# Patient Record
Sex: FEMALE | Race: WHITE | NOT HISPANIC OR LATINO | Employment: UNEMPLOYED | RURAL
[De-identification: names, ages, dates, MRNs, and addresses within clinical notes are randomized per-mention and may not be internally consistent; named-entity substitution may affect disease eponyms.]

---

## 2021-04-08 ENCOUNTER — OFFICE VISIT (OUTPATIENT)
Dept: OBSTETRICS AND GYNECOLOGY | Facility: CLINIC | Age: 61
End: 2021-04-08
Payer: COMMERCIAL

## 2021-04-08 VITALS
SYSTOLIC BLOOD PRESSURE: 130 MMHG | BODY MASS INDEX: 25.52 KG/M2 | DIASTOLIC BLOOD PRESSURE: 76 MMHG | HEIGHT: 61 IN | WEIGHT: 135.19 LBS

## 2021-04-08 DIAGNOSIS — K63.9 ARTHRITIS ASSOCIATED WITH INFLAMMATORY BOWEL DISEASE: ICD-10-CM

## 2021-04-08 DIAGNOSIS — N95.2 ATROPHIC VAGINITIS: Primary | ICD-10-CM

## 2021-04-08 DIAGNOSIS — K50.10 CROHN'S DISEASE OF ANAL CANAL: ICD-10-CM

## 2021-04-08 DIAGNOSIS — M07.60 ARTHRITIS ASSOCIATED WITH INFLAMMATORY BOWEL DISEASE: ICD-10-CM

## 2021-04-08 DIAGNOSIS — Z01.419 WOMEN'S ANNUAL ROUTINE GYNECOLOGICAL EXAMINATION: ICD-10-CM

## 2021-04-08 DIAGNOSIS — E89.41 SYMPTOMATIC POSTPROCEDURAL OVARIAN FAILURE: ICD-10-CM

## 2021-04-08 PROBLEM — K50.90 CROHN'S DISEASE: Status: ACTIVE | Noted: 2021-04-08

## 2021-04-08 PROCEDURE — G0101 PR CA SCREEN;PELVIC/BREAST EXAM: ICD-10-PCS | Mod: S$PBB,,, | Performed by: OBSTETRICS & GYNECOLOGY

## 2021-04-08 PROCEDURE — 99999 PR PBB SHADOW E&M-NEW PATIENT-LVL IV: CPT | Mod: PBBFAC,,, | Performed by: OBSTETRICS & GYNECOLOGY

## 2021-04-08 PROCEDURE — 99999 PR PBB SHADOW E&M-NEW PATIENT-LVL IV: ICD-10-PCS | Mod: PBBFAC,,, | Performed by: OBSTETRICS & GYNECOLOGY

## 2021-04-08 PROCEDURE — 99204 OFFICE O/P NEW MOD 45 MIN: CPT | Mod: PBBFAC | Performed by: OBSTETRICS & GYNECOLOGY

## 2021-04-08 PROCEDURE — G0101 CA SCREEN;PELVIC/BREAST EXAM: HCPCS | Mod: S$PBB,,, | Performed by: OBSTETRICS & GYNECOLOGY

## 2021-04-08 RX ORDER — AZATHIOPRINE 50 MG/1
50 TABLET ORAL DAILY
COMMUNITY

## 2021-04-08 RX ORDER — ATENOLOL 25 MG/1
25 TABLET ORAL DAILY
COMMUNITY

## 2021-04-08 RX ORDER — TRIAMTERENE/HYDROCHLOROTHIAZID 37.5-25 MG
1 TABLET ORAL DAILY
COMMUNITY

## 2021-04-08 RX ORDER — ESOMEPRAZOLE MAGNESIUM 40 MG/1
40 CAPSULE, DELAYED RELEASE ORAL
COMMUNITY

## 2021-04-08 RX ORDER — ESTRADIOL 0.1 MG/G
2 CREAM VAGINAL
COMMUNITY
End: 2021-10-07

## 2021-04-08 RX ORDER — METHOCARBAMOL 500 MG/1
500 TABLET, FILM COATED ORAL 2 TIMES DAILY
COMMUNITY

## 2021-04-08 RX ORDER — ZOLPIDEM TARTRATE 5 MG/1
5 TABLET ORAL NIGHTLY PRN
COMMUNITY

## 2021-04-08 RX ORDER — EZETIMIBE AND SIMVASTATIN 10; 80 MG/1; MG/1
1 TABLET ORAL DAILY
COMMUNITY

## 2021-04-08 RX ORDER — ESTRADIOL 0.1 MG/G
2 CREAM VAGINAL
Qty: 1 TUBE | Refills: 2 | Status: CANCELLED | OUTPATIENT
Start: 2021-04-08

## 2021-04-08 RX ORDER — GABAPENTIN 600 MG/1
600 TABLET ORAL 4 TIMES DAILY
COMMUNITY

## 2021-04-08 RX ORDER — ESTRADIOL 0.1 MG/G
CREAM VAGINAL
Qty: 42.5 G | Refills: 1 | Status: SHIPPED | OUTPATIENT
Start: 2021-04-08 | End: 2021-10-07

## 2021-10-07 ENCOUNTER — OFFICE VISIT (OUTPATIENT)
Dept: OBSTETRICS AND GYNECOLOGY | Facility: CLINIC | Age: 61
End: 2021-10-07
Payer: COMMERCIAL

## 2021-10-07 VITALS
WEIGHT: 135.19 LBS | DIASTOLIC BLOOD PRESSURE: 84 MMHG | BODY MASS INDEX: 25.55 KG/M2 | SYSTOLIC BLOOD PRESSURE: 138 MMHG

## 2021-10-07 DIAGNOSIS — N95.2 ATROPHIC VAGINITIS: Primary | ICD-10-CM

## 2021-10-07 PROCEDURE — 99212 PR OFFICE/OUTPT VISIT, EST, LEVL II, 10-19 MIN: ICD-10-PCS | Mod: S$PBB,,, | Performed by: OBSTETRICS & GYNECOLOGY

## 2021-10-07 PROCEDURE — 99212 OFFICE O/P EST SF 10 MIN: CPT | Mod: S$PBB,,, | Performed by: OBSTETRICS & GYNECOLOGY

## 2021-10-07 PROCEDURE — 99213 OFFICE O/P EST LOW 20 MIN: CPT | Mod: PBBFAC | Performed by: OBSTETRICS & GYNECOLOGY

## 2021-10-07 RX ORDER — BLOOD-GLUCOSE METER
KIT MISCELLANEOUS
COMMUNITY
Start: 2021-06-04

## 2021-10-07 RX ORDER — ESTRADIOL 0.1 MG/G
CREAM VAGINAL
Qty: 42.5 G | Refills: 1 | Status: SHIPPED | OUTPATIENT
Start: 2021-10-07 | End: 2023-03-20

## 2021-10-07 RX ORDER — ATENOLOL 50 MG/1
50 TABLET ORAL DAILY
COMMUNITY

## 2021-10-07 RX ORDER — BLOOD-GLUCOSE METER
KIT MISCELLANEOUS
COMMUNITY
Start: 2021-05-19

## 2021-10-07 RX ORDER — LANCETS 28 GAUGE
EACH MISCELLANEOUS
COMMUNITY
Start: 2021-05-19

## 2021-10-07 RX ORDER — ONDANSETRON 4 MG/1
TABLET, ORALLY DISINTEGRATING ORAL
COMMUNITY
Start: 2021-09-16

## 2023-06-29 DIAGNOSIS — N95.2 ATROPHIC VAGINITIS: ICD-10-CM

## 2023-07-05 RX ORDER — ESTRADIOL 0.1 MG/G
CREAM VAGINAL
Qty: 42.5 G | Refills: 1 | Status: SHIPPED | OUTPATIENT
Start: 2023-07-05 | End: 2023-11-02 | Stop reason: SDUPTHER

## 2023-11-02 DIAGNOSIS — N95.2 ATROPHIC VAGINITIS: ICD-10-CM

## 2023-11-02 RX ORDER — ESTRADIOL 0.1 MG/G
CREAM VAGINAL
Qty: 42.5 G | Refills: 1 | Status: SHIPPED | OUTPATIENT
Start: 2023-11-02 | End: 2024-01-12 | Stop reason: SDUPTHER

## 2024-01-12 ENCOUNTER — PATIENT MESSAGE (OUTPATIENT)
Dept: OBSTETRICS AND GYNECOLOGY | Facility: CLINIC | Age: 64
End: 2024-01-12
Payer: MEDICARE

## 2024-01-12 DIAGNOSIS — N95.2 ATROPHIC VAGINITIS: ICD-10-CM

## 2024-01-12 RX ORDER — ESTRADIOL 0.1 MG/G
CREAM VAGINAL
Qty: 42.5 G | Refills: 3 | Status: SHIPPED | OUTPATIENT
Start: 2024-01-12

## 2024-01-17 DIAGNOSIS — M25.512 LEFT SHOULDER PAIN, UNSPECIFIED CHRONICITY: Primary | ICD-10-CM

## 2024-01-18 ENCOUNTER — HOSPITAL ENCOUNTER (OUTPATIENT)
Dept: RADIOLOGY | Facility: HOSPITAL | Age: 64
Discharge: HOME | End: 2024-01-18
Payer: COMMERCIAL

## 2024-01-18 ENCOUNTER — OFFICE VISIT (OUTPATIENT)
Dept: ORTHOPEDICS | Facility: CLINIC | Age: 64
End: 2024-01-18
Payer: COMMERCIAL

## 2024-01-18 DIAGNOSIS — M25.512 LEFT SHOULDER PAIN, UNSPECIFIED CHRONICITY: ICD-10-CM

## 2024-01-18 DIAGNOSIS — M25.512 LEFT SHOULDER PAIN, UNSPECIFIED CHRONICITY: Primary | ICD-10-CM

## 2024-01-18 PROCEDURE — 99203 OFFICE O/P NEW LOW 30 MIN: CPT | Mod: S$PBB,25,,

## 2024-01-18 PROCEDURE — 20605 DRAIN/INJ JOINT/BURSA W/O US: CPT | Mod: PBBFAC,LT

## 2024-01-18 PROCEDURE — 99999PBSHW PR PBB SHADOW TECHNICAL ONLY FILED TO HB: Mod: JZ,PBBFAC,,

## 2024-01-18 PROCEDURE — 99214 OFFICE O/P EST MOD 30 MIN: CPT | Mod: PBBFAC,25

## 2024-01-18 PROCEDURE — 73030 X-RAY EXAM OF SHOULDER: CPT | Mod: TC,LT

## 2024-01-18 PROCEDURE — 73030 X-RAY EXAM OF SHOULDER: CPT | Mod: 26,LT,, | Performed by: RADIOLOGY

## 2024-01-18 RX ORDER — LOSARTAN POTASSIUM 50 MG/1
50 TABLET ORAL DAILY
COMMUNITY

## 2024-01-18 RX ORDER — TRIAMCINOLONE ACETONIDE 40 MG/ML
40 INJECTION, SUSPENSION INTRA-ARTICULAR; INTRAMUSCULAR
Status: DISCONTINUED | OUTPATIENT
Start: 2024-01-18 | End: 2024-01-18 | Stop reason: HOSPADM

## 2024-01-18 RX ORDER — BUSPIRONE HYDROCHLORIDE 15 MG/1
15 TABLET ORAL 2 TIMES DAILY
COMMUNITY

## 2024-01-18 RX ORDER — TRETINOIN 0.5 MG/G
0.05 CREAM TOPICAL NIGHTLY
COMMUNITY
Start: 2023-11-06

## 2024-01-18 RX ORDER — MELOXICAM 7.5 MG/1
7.5 TABLET ORAL DAILY
Qty: 30 TABLET | Refills: 0 | Status: SHIPPED | OUTPATIENT
Start: 2024-01-18

## 2024-01-18 RX ORDER — BUPIVACAINE HYDROCHLORIDE 2.5 MG/ML
25 INJECTION, SOLUTION EPIDURAL; INFILTRATION; INTRACAUDAL
Status: DISCONTINUED | OUTPATIENT
Start: 2024-01-18 | End: 2024-01-18 | Stop reason: HOSPADM

## 2024-01-18 RX ADMIN — BUPIVACAINE HYDROCHLORIDE 25 MG: 2.5 INJECTION, SOLUTION EPIDURAL; INFILTRATION; INTRACAUDAL at 09:01

## 2024-01-18 RX ADMIN — TRIAMCINOLONE ACETONIDE 40 MG: 40 INJECTION, SUSPENSION INTRA-ARTICULAR; INTRAMUSCULAR at 09:01

## 2024-01-18 NOTE — PATIENT INSTRUCTIONS
Left shoulder pain for 2-3 weeks after lifting heavy pots and pans in her kitchen.  Possible muscle strain versus impingement syndrome.  Personally reviewed x-rays today with minimal degenerative changes as noted above.  Discussed all treatment options.  Patient would like to pursue conservative management at this time, she is apprehensive about any possible surgical intervention as she has had multiple orthopedic surgeries in the past including her other shoulder in both knees.  Discussed steroid injection, Mobic, and physically were all options.  Discussed that she needs to continue to take her Nexium and will prescribe a low dose of Mobic.  We will give steroid injection today, see procedural note for details.  We will also order physical therapy for 6 weeks.  Patient to follow up in 6 weeks, sooner if needed.

## 2024-01-18 NOTE — PROCEDURES
Intermediate Joint Aspiration/Injection: L acromioclavicular    Date/Time: 1/18/2024 9:00 AM    Performed by: Delma Loja PA  Authorized by: Delma Loja PA    Consent Done?:  Yes (Verbal)  Indications:  Arthritis and pain    Location:  Shoulder  Site:  L acromioclavicular  Ultrasonic Guidance for needle placement: No  Needle size:  25 G  Approach:  Posterolateral  Medications:  25 mg BUPivacaine (PF) 0.25% (2.5 mg/ml) 0.25 % (2.5 mg/mL); 40 mg triamcinolone acetonide 40 mg/mL  Patient tolerance:  Patient tolerated the procedure well with no immediate complications

## 2024-01-18 NOTE — PROGRESS NOTES
CC:   Chief Complaint   Patient presents with    Left Shoulder - Pain          Gaye Poon is a 63 y.o. female seen today for left shoulder pain that began approximately 2-3 weeks ago when she was lifting several fraying pants in her kitchen.  Patient states that the pain began shortly after.  The majority of the pain is located in the back at this all shoulder.  She states that it hurts with certain movements and she can feel popping when lifting overhead.  Patient states that she is taken Mobic with some relief and use of heating pad.  She denies any numbness or tingling.  No other complaints today.      PAST MEDICAL HISTORY:   Past Medical History:   Diagnosis Date    Crohn disease     Hypertension           PAST SURGICAL HISTORY:   Past Surgical History:   Procedure Laterality Date     SECTION      COLECTOMY      HYSTERECTOMY      KNEE SURGERY      OOPHORECTOMY      REPAIR OF ANAL FISTULA      SHOULDER SURGERY            ALLERGIES:   Review of patient's allergies indicates:   Allergen Reactions    Ace inhibitors     Cimzia [certolizumab pegol]     Humira [adalimumab]     Methotrexate analogues     Remicade [infliximab]     Ticlopidine         MEDICATIONS :    Current Outpatient Medications:     atenoloL (TENORMIN) 25 MG tablet, Take 25 mg by mouth once daily., Disp: , Rfl:     atenoloL (TENORMIN) 50 MG tablet, Take 50 mg by mouth once daily., Disp: , Rfl:     azaTHIOprine (IMURAN) 50 mg Tab, Take 50 mg by mouth once daily. Takes 3 tablets by mouth daily, Disp: , Rfl:     esomeprazole (NEXIUM) 40 MG capsule, Take 40 mg by mouth before breakfast., Disp: , Rfl:     estradioL (ESTRACE) 0.01 % (0.1 mg/gram) vaginal cream, 1/2 applicator full per vagina once or twice monthly as needed for vaginal dryness, Disp: 42.5 g, Rfl: 3    ezetimibe-simvastatin 10-80 mg (VYTORIN) 10-80 mg per tablet, Take 1 tablet by mouth once daily., Disp: , Rfl:     FREESTYLE LANCETS 28 gauge lancets, CHECK  BLOOD SUGAR TWICE DAILY AND AS NEEDED., Disp: , Rfl:     FREESTYLE LITE METER kit, CHECK BLOOD SUGAR TWICE DAILY AND AS NEEDED, Disp: , Rfl:     FREESTYLE LITE STRIPS Strp, CHECK BLOOD SUGAR TWICE DAILY AND AS NEEDED., Disp: , Rfl:     gabapentin (NEURONTIN) 600 MG tablet, Take 600 mg by mouth 4 (four) times daily., Disp: , Rfl:     methocarbamoL (ROBAXIN) 500 MG Tab, Take 500 mg by mouth 2 (two) times a day., Disp: , Rfl:     ondansetron (ZOFRAN-ODT) 4 MG TbDL, DISSOLVE 1 TABLET ON TONGUE EVERY 8 HOURS AS NEEDED FOR NAUSEA, Disp: , Rfl:     triamterene-hydrochlorothiazide 37.5-25 mg (MAXZIDE-25) 37.5-25 mg per tablet, Take 1 tablet by mouth once daily., Disp: , Rfl:     zolpidem (AMBIEN) 5 MG Tab, Take 5 mg by mouth nightly as needed., Disp: , Rfl:      SOCIAL HISTORY:   Social History     Socioeconomic History    Marital status:    Tobacco Use    Smoking status: Never    Smokeless tobacco: Never   Substance and Sexual Activity    Alcohol use: Never    Drug use: Never        FAMILY HISTORY:   Family History   Problem Relation Age of Onset    Hypertension Father     Stroke Father     Heart disease Father     Diabetes Mother     Diabetes Brother           PHYSICAL EXAM:      There were no vitals filed for this visit.  There is no height or weight on file to calculate BMI.    GENERAL: Well-developed, well-nourished female . The patient is alert, oriented and cooperative.    HEENT:  Normocephalic, atraumatic.  Extraocular movements are intact bilaterally.     NECK:  Nontender with good range of motion.    LUNGS:  Clear to auscultation bilaterally.    HEART:  Regular rate and rhythm.     ABDOMEN:  Soft, non-tender, non-distended.      EXTREMITIES:  Left shoulder examination was skin clean dry and intact no swelling or erythema overlying the left shoulder, forward flexion to about 170° without pain abduction to 90° without pain, positive [place], good range of motion with Apley scratch test, neurovascularly  intact      RADIOGRAPHIC FINDINGS:   EXAMINATION:  XR SHOULDER COMPLETE 2 OR MORE VIEWS LEFT     CLINICAL HISTORY:  Pain in left shoulder     COMPARISON:  None     TECHNIQUE:  Frontal and scapular Y-views of the left shoulder.     FINDINGS:  Minimal degenerative change.  No convincing acute fracture or dislocation demonstrated. No concerning radiopaque foreign body visualized.     Impression:     As above.     Point of Service: [facility]        Electronically signed by: Vasquez Montoya  Date:                                            01/18/2024  Time:                                           08:45        IMPRESSION AND PLAN:  Left shoulder pain for 2-3 weeks after lifting heavy pots and pans in her kitchen.  Possible muscle strain versus impingement syndrome.  Personally reviewed x-rays today with minimal degenerative changes as noted above.  Discussed all treatment options.  Patient would like to pursue conservative management at this time, she is apprehensive about any possible surgical intervention as she has had multiple orthopedic surgeries in the past including her other shoulder in both knees.  Discussed steroid injection, Mobic, and physically were all options.  Discussed that she needs to continue to take her Nexium and will prescribe a low dose of Mobic.  We will give steroid injection today, see procedural note for details.  We will also order physical therapy for 6 weeks.  Patient to follow up in 6 weeks, sooner if needed.    No follow-ups on file.       Delma Loja PA-C      (Subject to voice recognition error, transcription service not allowed)

## 2024-02-29 ENCOUNTER — OFFICE VISIT (OUTPATIENT)
Dept: ORTHOPEDICS | Facility: CLINIC | Age: 64
End: 2024-02-29
Payer: COMMERCIAL

## 2024-02-29 DIAGNOSIS — M25.512 CHRONIC LEFT SHOULDER PAIN: Primary | ICD-10-CM

## 2024-02-29 DIAGNOSIS — G89.29 CHRONIC LEFT SHOULDER PAIN: Primary | ICD-10-CM

## 2024-02-29 PROCEDURE — 99212 OFFICE O/P EST SF 10 MIN: CPT | Mod: PBBFAC

## 2024-02-29 PROCEDURE — 99213 OFFICE O/P EST LOW 20 MIN: CPT | Mod: S$PBB,,,

## 2024-02-29 NOTE — PATIENT INSTRUCTIONS
Left shoulder pain improved after completing physical therapy program.  Discussed continuation of exercises at home for continued strengthening.  Follow up p.r.n..

## 2024-02-29 NOTE — PROGRESS NOTES
CC:   Chief Complaint   Patient presents with    Left Shoulder - Follow-up          Gaye Poon is a 64 y.o. female seen today for follow up of Follow-up of the Left Shoulder    Patient presents for follow up at the completion of physical therapy for left shoulder pain.  Patient states that she is feeling much better after completing physical therapy.  She is continued with home therapy exercises.  She denies no new complaints or injury today.    PAST MEDICAL HISTORY:   Past Medical History:   Diagnosis Date    Crohn disease     Hypertension         PAST SURGICAL HISTORY:   Past Surgical History:   Procedure Laterality Date     SECTION      COLECTOMY      HYSTERECTOMY      KNEE SURGERY      OOPHORECTOMY      REPAIR OF ANAL FISTULA      SHOULDER SURGERY          ALLERGIES:   Review of patient's allergies indicates:   Allergen Reactions    Ace inhibitors     Cimzia [certolizumab pegol]     Humira [adalimumab]     Iodinated contrast media Hives     Note: - [employer] 2018    Methotrexate analogues     Remicade [infliximab]     Ticlopidine         MEDICATIONS :    Current Outpatient Medications:     atenoloL (TENORMIN) 25 MG tablet, Take 25 mg by mouth once daily., Disp: , Rfl:     atenoloL (TENORMIN) 50 MG tablet, Take 50 mg by mouth once daily., Disp: , Rfl:     azaTHIOprine (IMURAN) 50 mg Tab, Take 50 mg by mouth once daily. Takes 3 tablets by mouth daily, Disp: , Rfl:     busPIRone (BUSPAR) 15 MG tablet, Take 15 mg by mouth 2 (two) times daily., Disp: , Rfl:     esomeprazole (NEXIUM) 40 MG capsule, Take 40 mg by mouth before breakfast., Disp: , Rfl:     estradioL (ESTRACE) 0.01 % (0.1 mg/gram) vaginal cream, 1/2 applicator full per vagina once or twice monthly as needed for vaginal dryness, Disp: 42.5 g, Rfl: 3    ezetimibe-simvastatin 10-80 mg (VYTORIN) 10-80 mg per tablet, Take 1 tablet by mouth once daily., Disp: , Rfl:     FREESTYLE LANCETS 28 gauge lancets, CHECK BLOOD SUGAR  TWICE DAILY AND AS NEEDED., Disp: , Rfl:     FREESTYLE LITE METER kit, CHECK BLOOD SUGAR TWICE DAILY AND AS NEEDED, Disp: , Rfl:     FREESTYLE LITE STRIPS Strp, CHECK BLOOD SUGAR TWICE DAILY AND AS NEEDED., Disp: , Rfl:     gabapentin (NEURONTIN) 600 MG tablet, Take 600 mg by mouth 4 (four) times daily., Disp: , Rfl:     INV nifedipine ER 30 MG Tab tablet, Take 30 mg by mouth once daily., Disp: , Rfl:     losartan (COZAAR) 50 MG tablet, Take 50 mg by mouth once daily., Disp: , Rfl:     meloxicam (MOBIC) 7.5 MG tablet, Take 1 tablet (7.5 mg total) by mouth once daily., Disp: 30 tablet, Rfl: 0    methocarbamoL (ROBAXIN) 500 MG Tab, Take 500 mg by mouth 2 (two) times a day., Disp: , Rfl:     ondansetron (ZOFRAN-ODT) 4 MG TbDL, DISSOLVE 1 TABLET ON TONGUE EVERY 8 HOURS AS NEEDED FOR NAUSEA, Disp: , Rfl:     tretinoin (RETIN-A) 0.05 % cream, Apply 0.05 g topically nightly., Disp: , Rfl:     triamterene-hydrochlorothiazide 37.5-25 mg (MAXZIDE-25) 37.5-25 mg per tablet, Take 1 tablet by mouth once daily., Disp: , Rfl:     zolpidem (AMBIEN) 5 MG Tab, Take 5 mg by mouth nightly as needed., Disp: , Rfl:      SOCIAL HISTORY:   Social History     Socioeconomic History    Marital status:    Tobacco Use    Smoking status: Never    Smokeless tobacco: Never   Substance and Sexual Activity    Alcohol use: Never    Drug use: Never        FAMILY HISTORY:   Family History   Problem Relation Age of Onset    Hypertension Father     Stroke Father     Heart disease Father     Diabetes Mother     Diabetes Brother           PHYSICAL EXAM:      There were no vitals filed for this visit.  There is no height or weight on file to calculate BMI.    GENERAL: Well-developed, well-nourished female . The patient is alert, oriented and cooperative.    EXTREMITIES:  Left shoulder was skin clean dry and intact, forward flexion to approximately 170°, abduction to about 90°, good range of motion with Apley scratch testing, neurovascularly  intact      RADIOGRAPHIC FINDINGS:   No results found.     Patient Active Problem List    Diagnosis Date Noted    Crohn's disease 04/08/2021     IMPRESSION AND PLAN:  Left shoulder pain improved after completing physical therapy program.  Discussed continuation of exercises at home for continued strengthening.  Follow up p.r.n..        No follow-ups on file.       Delma Loja PA-C      (Subject to voice recognition error, transcription service not allowed)